# Patient Record
Sex: FEMALE | URBAN - METROPOLITAN AREA
[De-identification: names, ages, dates, MRNs, and addresses within clinical notes are randomized per-mention and may not be internally consistent; named-entity substitution may affect disease eponyms.]

---

## 2023-04-17 ENCOUNTER — APPOINTMENT (RX ONLY)
Dept: URBAN - METROPOLITAN AREA CLINIC 147 | Facility: CLINIC | Age: 23
Setting detail: DERMATOLOGY
End: 2023-04-17

## 2023-04-17 DIAGNOSIS — Z41.9 ENCOUNTER FOR PROCEDURE FOR PURPOSES OTHER THAN REMEDYING HEALTH STATE, UNSPECIFIED: ICD-10-CM

## 2023-04-17 PROCEDURE — ? SIGNATURE HYDRAFACIAL

## 2023-04-17 NOTE — PROCEDURE: SIGNATURE HYDRAFACIAL
Tip: Hydropeel Tip, Blue
Comments: Patient concern- congestion, oily
Number Of Passes: 0
Solution Override
Price (Use Numbers Only, No Special Characters Or $): 199
Vacuum Pressure Low Setting (Will Not Render If Set To 0): 16
Tip: Hydropeel Tip, Teal
Indication: skin texture
Procedure: Extraction
Tip Override
Stroke (Optional): manual extractions- nose, chin, cheeks
Solution Override: serums- blemish correction serum, aox
Solution Override: antiox+
Procedure: Exfoliation
Procedure: Peel
Tip: Hydropeel Tip, Clear
Procedure: Extend and Protect
Solution: Activ-4
Location: face
Solution: Beta-HD
Procedure: Fusion
Solution: GlySal 7.5%
Consent: Written consent obtained, risks reviewed including but not limited to crusting, scabbing, blistering, scarring, darker or lighter pigmentary change, bruising, and/or incomplete response.
Solution Override: applied to nose, cheeks, and chin
Treatment Number: 1
Post-Care Instructions: I reviewed with the patient in detail post-care instructions. Patient should stay away from the sun and wear sun protection until treated areas are fully healed.